# Patient Record
Sex: MALE | NOT HISPANIC OR LATINO | Employment: OTHER | URBAN - METROPOLITAN AREA
[De-identification: names, ages, dates, MRNs, and addresses within clinical notes are randomized per-mention and may not be internally consistent; named-entity substitution may affect disease eponyms.]

---

## 2023-09-06 ENCOUNTER — HOSPITAL ENCOUNTER (OUTPATIENT)
Dept: DATA CONVERSION | Facility: HOSPITAL | Age: 88
Discharge: HOME | End: 2023-09-06
Payer: MEDICARE

## 2023-09-06 DIAGNOSIS — H11.231 SYMBLEPHARON, RIGHT EYE: ICD-10-CM

## 2023-12-14 ENCOUNTER — OFFICE VISIT (OUTPATIENT)
Dept: OPHTHALMOLOGY | Facility: CLINIC | Age: 88
End: 2023-12-14
Payer: MEDICARE

## 2023-12-14 DIAGNOSIS — H02.401 ACQUIRED PTOSIS OF EYELID, RIGHT: ICD-10-CM

## 2023-12-14 DIAGNOSIS — H11.231 SYMBLEPHARON, RIGHT EYE: ICD-10-CM

## 2023-12-14 DIAGNOSIS — H11.241 SCAR OF CONJUNCTIVA OF RIGHT EYE: Primary | ICD-10-CM

## 2023-12-14 PROCEDURE — 92002 INTRM OPH EXAM NEW PATIENT: CPT | Performed by: STUDENT IN AN ORGANIZED HEALTH CARE EDUCATION/TRAINING PROGRAM

## 2023-12-14 RX ORDER — FLUTICASONE PROPIONATE 50 MCG
1 SPRAY, SUSPENSION (ML) NASAL DAILY
COMMUNITY

## 2023-12-14 RX ORDER — VERAPAMIL HYDROCHLORIDE 240 MG/1
240 CAPSULE, EXTENDED RELEASE ORAL NIGHTLY
COMMUNITY

## 2023-12-14 RX ORDER — BUTALB/ACETAMINOPHEN/CAFFEINE 50-325-40
1 TABLET ORAL DAILY
COMMUNITY

## 2023-12-14 RX ORDER — PRAVASTATIN SODIUM 40 MG/1
40 TABLET ORAL NIGHTLY
COMMUNITY

## 2023-12-14 RX ORDER — LISINOPRIL 10 MG/1
10 TABLET ORAL DAILY
COMMUNITY

## 2023-12-14 RX ORDER — OMEPRAZOLE 40 MG/1
40 CAPSULE, DELAYED RELEASE ORAL DAILY
COMMUNITY

## 2023-12-14 RX ORDER — METOPROLOL TARTRATE 50 MG/1
50 TABLET ORAL DAILY
COMMUNITY

## 2023-12-14 ASSESSMENT — CONF VISUAL FIELD
OD_SUPERIOR_NASAL_RESTRICTION: 0
OD_INFERIOR_NASAL_RESTRICTION: 0
OD_INFERIOR_TEMPORAL_RESTRICTION: 0
OS_NORMAL: 1
METHOD: COUNTING FINGERS
OD_NORMAL: 1
OS_INFERIOR_NASAL_RESTRICTION: 0
OS_INFERIOR_TEMPORAL_RESTRICTION: 0
OD_SUPERIOR_TEMPORAL_RESTRICTION: 0
OS_SUPERIOR_TEMPORAL_RESTRICTION: 0
OS_SUPERIOR_NASAL_RESTRICTION: 0

## 2023-12-14 ASSESSMENT — VISUAL ACUITY
CORRECTION_TYPE: GLASSES
OD_PH_CC+: -1
METHOD: SNELLEN - LINEAR
OS_CC: 20/20
OD_CC: 20/70
OS_CC+: -1
OD_PH_CC: 20/60

## 2023-12-14 ASSESSMENT — ENCOUNTER SYMPTOMS
PSYCHIATRIC NEGATIVE: 0
MUSCULOSKELETAL NEGATIVE: 0
NEUROLOGICAL NEGATIVE: 0
ENDOCRINE NEGATIVE: 0
CONSTITUTIONAL NEGATIVE: 0
CARDIOVASCULAR NEGATIVE: 0
HEMATOLOGIC/LYMPHATIC NEGATIVE: 0
EYES NEGATIVE: 0
RESPIRATORY NEGATIVE: 0
GASTROINTESTINAL NEGATIVE: 0
ALLERGIC/IMMUNOLOGIC NEGATIVE: 0

## 2023-12-14 ASSESSMENT — TONOMETRY
IOP_METHOD: GOLDMANN APPLANATION
OD_IOP_MMHG: 10
OS_IOP_MMHG: 15

## 2023-12-14 ASSESSMENT — EXTERNAL EXAM - RIGHT EYE: OD_EXAM: NORMAL

## 2023-12-14 ASSESSMENT — CUP TO DISC RATIO
OS_RATIO: .40
OD_RATIO: .40

## 2023-12-14 ASSESSMENT — EXTERNAL EXAM - LEFT EYE: OS_EXAM: NORMAL

## 2023-12-15 PROBLEM — H02.401: Status: ACTIVE | Noted: 2023-12-15

## 2023-12-15 PROBLEM — H11.231: Status: ACTIVE | Noted: 2023-12-15

## 2023-12-15 PROBLEM — H11.241: Status: ACTIVE | Noted: 2023-12-15

## 2023-12-15 NOTE — PROGRESS NOTES
Assessment/Plan   Diagnoses and all orders for this visit:  Acquired ptosis of eyelid, right  Scar of conjunctiva of right eye  Symblepharon, right eye  -new patient presents to clinic with referral to  from Dr. 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 It is unclear if Dr. 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 MRI at that time was wnl.     -will discuss findings and case with Dr. Castellanos. Referral to Dr. Castellanos for further management.

## 2024-01-05 ENCOUNTER — APPOINTMENT (OUTPATIENT)
Dept: OPHTHALMOLOGY | Facility: CLINIC | Age: 89
End: 2024-01-05
Payer: MEDICARE

## 2024-01-12 ENCOUNTER — OFFICE VISIT (OUTPATIENT)
Dept: OPHTHALMOLOGY | Facility: CLINIC | Age: 89
End: 2024-01-12
Payer: MEDICARE

## 2024-01-12 DIAGNOSIS — H11.231 SYMBLEPHARON, RIGHT EYE: Primary | ICD-10-CM

## 2024-01-12 DIAGNOSIS — Z96.1 PSEUDOPHAKIA OF BOTH EYES: ICD-10-CM

## 2024-01-12 DIAGNOSIS — H02.401 ACQUIRED PTOSIS OF EYELID, RIGHT: ICD-10-CM

## 2024-01-12 DIAGNOSIS — H11.241 SCAR OF CONJUNCTIVA OF RIGHT EYE: ICD-10-CM

## 2024-01-12 PROCEDURE — 99203 OFFICE O/P NEW LOW 30 MIN: CPT | Performed by: OPHTHALMOLOGY

## 2024-01-12 ASSESSMENT — TONOMETRY
OD_IOP_MMHG: 14
IOP_METHOD: GOLDMANN APPLANATION
OS_IOP_MMHG: 16

## 2024-01-12 ASSESSMENT — VISUAL ACUITY
METHOD: SNELLEN - LINEAR
OD_CC+: +2
OD_CC: 20/50
OS_CC+: +2
OS_CC: 20/25
CORRECTION_TYPE: GLASSES

## 2024-01-12 ASSESSMENT — ENCOUNTER SYMPTOMS
GASTROINTESTINAL NEGATIVE: 0
ALLERGIC/IMMUNOLOGIC NEGATIVE: 0
PSYCHIATRIC NEGATIVE: 0
EYES NEGATIVE: 1
NEUROLOGICAL NEGATIVE: 0
CARDIOVASCULAR NEGATIVE: 0
MUSCULOSKELETAL NEGATIVE: 0
CONSTITUTIONAL NEGATIVE: 0
ENDOCRINE NEGATIVE: 0
HEMATOLOGIC/LYMPHATIC NEGATIVE: 0
RESPIRATORY NEGATIVE: 0

## 2024-01-12 ASSESSMENT — CONF VISUAL FIELD
OS_INFERIOR_TEMPORAL_RESTRICTION: 0
OD_SUPERIOR_TEMPORAL_RESTRICTION: 0
OS_SUPERIOR_NASAL_RESTRICTION: 0
OD_NORMAL: 1
OD_INFERIOR_NASAL_RESTRICTION: 0
OS_NORMAL: 1
OS_SUPERIOR_TEMPORAL_RESTRICTION: 0
OD_SUPERIOR_NASAL_RESTRICTION: 0
OS_INFERIOR_NASAL_RESTRICTION: 0
OD_INFERIOR_TEMPORAL_RESTRICTION: 0

## 2024-01-12 ASSESSMENT — EXTERNAL EXAM - RIGHT EYE: OD_EXAM: NORMAL

## 2024-01-12 ASSESSMENT — EXTERNAL EXAM - LEFT EYE: OS_EXAM: NORMAL

## 2024-01-12 NOTE — PROGRESS NOTES
Assessment/Plan   Diagnoses and all orders for this visit:  Symblepharon, right eye  Acquired ptosis of eyelid, right  Scar of conjunctiva of right eye  Pt here to rule out OCP   Significant symblepharon OD superior conj with some shortening of fornix OU temporally  Patient hx of working in chemical plant for 40 years, no trauma or exposure directly to eye  Previous pemphigoid bx negative  No inciting events prior to start of symptoms  No signs of active inflammation OU today  Patient without visual complaints, mainly concerned about ptosis  Discussed with pt symblepharon release + ptosis repair OD  Explained to pt that recurrence of symblepharon is unlikely but could still happen  Pt will have surgery with Dr. Ramirez  Cornea prn

## 2024-01-12 NOTE — LETTER
January 12, 2024    Brady Ramirez MD  [address]  [address]  [facility] [address]    Patient: Rasheed Blanco   YOB: 1935   Date of Visit: 1/12/2024       Dear Dr. Brady Ramirez MD:    Thank you for referring Rasheed Blanco to me for evaluation. Below are the relevant portions of the exam, along with my assessment and plan of care.    Vision readings for this visit:  Visual Acuity (Snellen - Linear)         Right Left    Dist cc 20/50 +2 20/25 +2      Correction: Glasses          Tonometry (Goldmann Applanation, 2:00 PM)         Right Left    Pressure 14 16            Assessment/Plan:  Diagnoses and all orders for this visit:  Symblepharon, right eye  Acquired ptosis of eyelid, right  Scar of conjunctiva of right eye  Pt here to rule out OCP   Significant symblepharon OD superior conj with some shortening of fornix OU temporally  Patient hx of working in chemical plant for 40 years, no trauma or exposure directly to eye  Previous pemphigoid bx negative  No inciting events prior to start of symptoms  No signs of active inflammation OU today  Patient without visual complaints, mainly concerned about ptosis  Discussed with pt symblepharon release + ptosis repair OD  Explained to pt that recurrence of symblepharon is unlikely but could still happen  Pt will have surgery with Dr. Ramirez  Cornea prn        If you have questions, please do not hesitate to call me. I look forward to following Rasheed along with you.         Sincerely,        Rosa Castellanos MD        CC:   No Recipients